# Patient Record
Sex: MALE | Race: WHITE | NOT HISPANIC OR LATINO | URBAN - METROPOLITAN AREA
[De-identification: names, ages, dates, MRNs, and addresses within clinical notes are randomized per-mention and may not be internally consistent; named-entity substitution may affect disease eponyms.]

---

## 2024-06-26 ENCOUNTER — OFFICE (OUTPATIENT)
Dept: URBAN - METROPOLITAN AREA CLINIC 11 | Facility: CLINIC | Age: 34
End: 2024-06-26

## 2024-06-26 VITALS
SYSTOLIC BLOOD PRESSURE: 229 MMHG | DIASTOLIC BLOOD PRESSURE: 91 MMHG | WEIGHT: 200 LBS | DIASTOLIC BLOOD PRESSURE: 120 MMHG | HEART RATE: 81 BPM | HEIGHT: 68 IN | OXYGEN SATURATION: 99 % | SYSTOLIC BLOOD PRESSURE: 171 MMHG

## 2024-06-26 DIAGNOSIS — R19.5 OTHER FECAL ABNORMALITIES: ICD-10-CM

## 2024-06-26 DIAGNOSIS — F41.9 ANXIETY DISORDER, UNSPECIFIED: ICD-10-CM

## 2024-06-26 DIAGNOSIS — Z56.6 OTHER PHYSICAL AND MENTAL STRAIN RELATED TO WORK: ICD-10-CM

## 2024-06-26 DIAGNOSIS — R10.84 GENERALIZED ABDOMINAL PAIN: ICD-10-CM

## 2024-06-26 DIAGNOSIS — R15.2 FECAL URGENCY: ICD-10-CM

## 2024-06-26 DIAGNOSIS — R14.0 ABDOMINAL DISTENSION (GASEOUS): ICD-10-CM

## 2024-06-26 DIAGNOSIS — K90.49 MALABSORPTION DUE TO INTOLERANCE, NOT ELSEWHERE CLASSIFIED: ICD-10-CM

## 2024-06-26 PROCEDURE — 99204 OFFICE O/P NEW MOD 45 MIN: CPT | Performed by: STUDENT IN AN ORGANIZED HEALTH CARE EDUCATION/TRAINING PROGRAM

## 2024-06-26 RX ORDER — DICYCLOMINE HYDROCHLORIDE 10 MG/1
CAPSULE ORAL
Qty: 90 | Refills: 3 | Status: ACTIVE
Start: 2024-06-26

## 2024-06-26 SDOH — HEALTH STABILITY - MENTAL HEALTH: OTHER PHYSICAL AND MENTAL STRAIN RELATED TO WORK: Z56.6

## 2024-06-26 NOTE — SERVICENOTES
We will give a trial of dicyclomine.  We will do basic workup today.  Follow up in 3 months.  Avoid NSAIDs.  His symptoms sound very suspicious with irritable bowel syndrome.  If he has no better on the dicyclomine we could give him a trial of Xifaxan next visit.  Also could consider a low-dose amitriptyline in the future since he does struggle with anxiety.  All questions addressed.   We may consider EGD colonoscopy at next visit as well if he has not improving.

## 2024-06-26 NOTE — SERVICEHPINOTES
Renzo Preston   is a   33   year old  male  with PMH significant for asthma and anxiety presents to [facility] with complaints of loose stools, gas/abdominal pains and and his gut health being "out of balance".br
br   Clinic Visit 6/26/24:
br Patient reports  Loose stools daily for years.  He has increased anxiety and is seeing a counselor for this.  Feels like his gut health is out of balance.  He has a lot of gas and bloating.  Not on any NSAIDs.  He has seasonal allergies and has family members with food allergies.   He is under a lot of stress at work as a .  No blood in the stool currently or any black tarry stool.  No unintentional weight loss.  No dysphagia.  No real reflux symptoms.  He has some abdominal cramping and gurgling that improves when he has a bowel movement.  He does report that he feels like his bowel movements are related to his stress and anxiety levels.  He has tried some antidepressants in the past and antianxiety medications but he did not like the way they made him feel so he has not currently on anything.

## 2024-07-19 ENCOUNTER — OFFICE (OUTPATIENT)
Dept: URBAN - METROPOLITAN AREA PATHOLOGY 12 | Facility: PATHOLOGY | Age: 34
End: 2024-07-19

## 2024-07-19 ENCOUNTER — AMBULATORY SURGICAL CENTER (OUTPATIENT)
Dept: URBAN - METROPOLITAN AREA SURGERY 3 | Facility: SURGERY | Age: 34
End: 2024-07-19

## 2024-07-19 ENCOUNTER — AMBULATORY SURGICAL CENTER (OUTPATIENT)
Dept: URBAN - METROPOLITAN AREA SURGERY 3 | Facility: SURGERY | Age: 34
End: 2024-07-19
Payer: COMMERCIAL

## 2024-07-19 VITALS
HEART RATE: 84 BPM | SYSTOLIC BLOOD PRESSURE: 126 MMHG | DIASTOLIC BLOOD PRESSURE: 62 MMHG | RESPIRATION RATE: 18 BRPM | SYSTOLIC BLOOD PRESSURE: 109 MMHG | HEART RATE: 65 BPM | HEART RATE: 77 BPM | OXYGEN SATURATION: 98 % | HEART RATE: 69 BPM | HEART RATE: 71 BPM | OXYGEN SATURATION: 98 % | DIASTOLIC BLOOD PRESSURE: 62 MMHG | RESPIRATION RATE: 16 BRPM | OXYGEN SATURATION: 95 % | SYSTOLIC BLOOD PRESSURE: 116 MMHG | HEIGHT: 68 IN | RESPIRATION RATE: 17 BRPM | TEMPERATURE: 98 F | HEART RATE: 65 BPM | DIASTOLIC BLOOD PRESSURE: 77 MMHG | OXYGEN SATURATION: 95 % | SYSTOLIC BLOOD PRESSURE: 126 MMHG | HEART RATE: 70 BPM | DIASTOLIC BLOOD PRESSURE: 69 MMHG | DIASTOLIC BLOOD PRESSURE: 77 MMHG | SYSTOLIC BLOOD PRESSURE: 108 MMHG | OXYGEN SATURATION: 97 % | RESPIRATION RATE: 17 BRPM | SYSTOLIC BLOOD PRESSURE: 128 MMHG | WEIGHT: 195 LBS | HEART RATE: 70 BPM | HEART RATE: 84 BPM | DIASTOLIC BLOOD PRESSURE: 69 MMHG | HEART RATE: 66 BPM | SYSTOLIC BLOOD PRESSURE: 109 MMHG | TEMPERATURE: 98.9 F | SYSTOLIC BLOOD PRESSURE: 115 MMHG | SYSTOLIC BLOOD PRESSURE: 115 MMHG | HEIGHT: 68 IN | OXYGEN SATURATION: 97 % | DIASTOLIC BLOOD PRESSURE: 65 MMHG | SYSTOLIC BLOOD PRESSURE: 128 MMHG | DIASTOLIC BLOOD PRESSURE: 65 MMHG | SYSTOLIC BLOOD PRESSURE: 119 MMHG | TEMPERATURE: 98 F | SYSTOLIC BLOOD PRESSURE: 116 MMHG | HEART RATE: 69 BPM | RESPIRATION RATE: 18 BRPM | HEART RATE: 66 BPM | DIASTOLIC BLOOD PRESSURE: 74 MMHG | DIASTOLIC BLOOD PRESSURE: 74 MMHG | HEART RATE: 71 BPM | RESPIRATION RATE: 16 BRPM | HEART RATE: 77 BPM | WEIGHT: 195 LBS | SYSTOLIC BLOOD PRESSURE: 119 MMHG | TEMPERATURE: 98.9 F | SYSTOLIC BLOOD PRESSURE: 108 MMHG

## 2024-07-19 DIAGNOSIS — K29.50 UNSPECIFIED CHRONIC GASTRITIS WITHOUT BLEEDING: ICD-10-CM

## 2024-07-19 DIAGNOSIS — K52.89 OTHER SPECIFIED NONINFECTIVE GASTROENTERITIS AND COLITIS: ICD-10-CM

## 2024-07-19 DIAGNOSIS — R19.7 DIARRHEA, UNSPECIFIED: ICD-10-CM

## 2024-07-19 DIAGNOSIS — K31.89 OTHER DISEASES OF STOMACH AND DUODENUM: ICD-10-CM

## 2024-07-19 PROBLEM — K63.89 OTHER SPECIFIED DISEASES OF INTESTINE: Status: ACTIVE | Noted: 2024-07-19

## 2024-07-19 PROCEDURE — 88313 SPECIAL STAINS GROUP 2: CPT | Performed by: STUDENT IN AN ORGANIZED HEALTH CARE EDUCATION/TRAINING PROGRAM

## 2024-07-19 PROCEDURE — 43239 EGD BIOPSY SINGLE/MULTIPLE: CPT | Mod: 51 | Performed by: STUDENT IN AN ORGANIZED HEALTH CARE EDUCATION/TRAINING PROGRAM

## 2024-07-19 PROCEDURE — 45380 COLONOSCOPY AND BIOPSY: CPT | Performed by: STUDENT IN AN ORGANIZED HEALTH CARE EDUCATION/TRAINING PROGRAM

## 2024-07-19 PROCEDURE — 88305 TISSUE EXAM BY PATHOLOGIST: CPT | Performed by: STUDENT IN AN ORGANIZED HEALTH CARE EDUCATION/TRAINING PROGRAM

## 2024-07-19 PROCEDURE — 88342 IMHCHEM/IMCYTCHM 1ST ANTB: CPT | Performed by: STUDENT IN AN ORGANIZED HEALTH CARE EDUCATION/TRAINING PROGRAM

## 2024-07-19 NOTE — SERVICEHPINOTES
Renzo Preston is a 33 year old male with PMH significant for asthma and anxiety initially presented to [facility] with complaints of loose stools, gas/abdominal pains and and his gut health being "out of balance".Clinic Visit 6/26/24:brPatient reports  Loose stools daily for years.  He has increased anxiety and is seeing a counselor for this.  Feels like his gut health is out of balance.  He has a lot of gas and bloating.  Not on any NSAIDs.  He has seasonal allergies and has family members with food allergies.   He is under a lot of stress at work as a .  No blood in the stool currently or any black tarry stool.  No unintentional weight loss.  No dysphagia.  No real reflux symptoms.  He has some abdominal cramping and gurgling that improves when he has a bowel movement.  He does report that he feels like his bowel movements are related to his stress and anxiety levels.  He has tried some antidepressants in the past and antianxiety medications but he did not like the way they made him feel so he has not currently on anything. 
br
br   EGD/colonoscopy 7/19/24:  
br
celiac serologies are positive, no FHx of colon cancer, no previous abd surgeries, has some left hip pain after lifting a TV for the last month, last BM was clear, not on blood thinners

## 2024-07-23 LAB
GASTRO ONE PATHOLOGY: PDF REPORT: (no result)
GASTRO ONE PATHOLOGY: PDF REPORT: (no result)

## 2025-04-02 ENCOUNTER — OFFICE (OUTPATIENT)
Dept: URBAN - METROPOLITAN AREA CLINIC 11 | Facility: CLINIC | Age: 35
End: 2025-04-02

## 2025-04-02 VITALS
WEIGHT: 193 LBS | OXYGEN SATURATION: 99 % | HEART RATE: 73 BPM | HEIGHT: 68 IN | DIASTOLIC BLOOD PRESSURE: 85 MMHG | SYSTOLIC BLOOD PRESSURE: 173 MMHG

## 2025-04-02 DIAGNOSIS — K90.0 CELIAC DISEASE: ICD-10-CM

## 2025-04-02 DIAGNOSIS — R19.5 OTHER FECAL ABNORMALITIES: ICD-10-CM

## 2025-04-02 DIAGNOSIS — R10.9 UNSPECIFIED ABDOMINAL PAIN: ICD-10-CM

## 2025-04-02 PROCEDURE — 99214 OFFICE O/P EST MOD 30 MIN: CPT | Performed by: STUDENT IN AN ORGANIZED HEALTH CARE EDUCATION/TRAINING PROGRAM

## 2025-04-02 NOTE — SERVICENOTES
patient reports he is actually doing very well today on his current regimen.  He does not need refills on dicyclomine.  He is due for DEXA scan given his history of celiac disease will schedule that.  For his left lower quadrant soreness I offered to do CT scan to further evaluate for hernia or other pathology.  The patient reports that his symptoms are very mild so he will just keep an eye on it and call us if he wants to move forward with the CT scan.  Continue gluten free diet.  He is up-to-date on his vaccines, repeat  celiac serologies at next visit to see how well he is controlled at the 1 year sammi.  Return to clinic in 6 months or sooner if needed.  Personally reviewed his previous medical records for his visit today.  All questions addressed.

## 2025-04-02 NOTE — SERVICEHPINOTES
Renzo Preston is a 34 year old male with PMH significant for Celiac disease, asthma and anxiety initially presented to [facility] with complaints of loose stools, gas/abdominal pains and and his gut health being "out of balance" - Eventually diagnosed with celiac disease.Clinic Visit 6/26/24:brPatient reports  Loose stools daily for years.  He has increased anxiety and is seeing a counselor for this.  Feels like his gut health is out of balance.  He has a lot of gas and bloating.  Not on any NSAIDs.  He has seasonal allergies and has family members with food allergies.   He is under a lot of stress at work as a .  No blood in the stool currently or any black tarry stool.  No unintentional weight loss.  No dysphagia.  No real reflux symptoms.  He has some abdominal cramping and gurgling that improves when he has a bowel movement.  He does report that he feels like his bowel movements are related to his stress and anxiety levels.  He has tried some antidepressants in the past and antianxiety medications but he did not like the way they made him feel so he has not currently on anything.EGD/colonoscopy 7/19/24:brceliac serologies are positive, no FHx of colon cancer, no previous abd surgeries, has some left hip pain after lifting a TV for the last month, last BM was clear, not on blood thinners  Clinic visit 10/02/2024:brpatient was found to have celiac disease on last check confirmed by biopsies.  He reports that he has tried to eat a gluten free diet but does have contamination every once in awhile.  He also has started eating cereal with regular milk in the mornings and he reports this sometimes gives him an upset stomach.  Other dairy products do not bother him at all though.  His food allergy testing was negative.  He reports using the dicyclomine about once a day and this does help.  He has lost about 10 lb intentionally due to his diet. 
br
br    clinic visit 04/02/2025:
br 
 patient reports he is overall doing much better than when he was 1st diagnosed.  He has gained some weight and has adjusted his diet to be gluten free.  He takes dicyclomine once or twice a day for irritable bowel.  He notices his symptoms are worse when he has more stress or anxiety.  He reports that he is up-to-date on his vaccinations.   he and his wife had a new baby back in October.  He endorses some left lower quadrant pain for the last month.  He reports it is more apparent in the mornings and then fade away during the day to the point where it does not bother him at all.  He reports it is not related to bowel movements or food consumption.  he has a history of a left hernia that was repaired as a child and he reports that the scar sometimes feels "tight".  He wonders if this is due to weight gain over the past few months.